# Patient Record
Sex: FEMALE | Race: WHITE | NOT HISPANIC OR LATINO | Employment: FULL TIME | URBAN - METROPOLITAN AREA
[De-identification: names, ages, dates, MRNs, and addresses within clinical notes are randomized per-mention and may not be internally consistent; named-entity substitution may affect disease eponyms.]

---

## 2017-11-16 ENCOUNTER — APPOINTMENT (OUTPATIENT)
Dept: WOMENS IMAGING | Facility: HOSPITAL | Age: 52
End: 2017-11-16

## 2017-11-16 PROCEDURE — 77067 SCR MAMMO BI INCL CAD: CPT | Performed by: RADIOLOGY

## 2019-10-11 ENCOUNTER — APPOINTMENT (OUTPATIENT)
Dept: WOMENS IMAGING | Facility: HOSPITAL | Age: 54
End: 2019-10-11

## 2019-10-11 PROCEDURE — 77067 SCR MAMMO BI INCL CAD: CPT | Performed by: RADIOLOGY

## 2019-11-11 ENCOUNTER — OFFICE VISIT CONVERTED (OUTPATIENT)
Dept: FAMILY MEDICINE CLINIC | Age: 54
End: 2019-11-11
Attending: NURSE PRACTITIONER

## 2019-11-11 ENCOUNTER — HOSPITAL ENCOUNTER (OUTPATIENT)
Dept: OTHER | Facility: HOSPITAL | Age: 54
Discharge: HOME | End: 2019-11-11
Attending: NURSE PRACTITIONER

## 2021-05-18 NOTE — PROGRESS NOTES
Kendal Bailey  1965     Office/Outpatient Visit    Visit Date: Mon, Nov 11, 2019 02:42 pm    Provider: Lita Baird N.P. (Assistant: Kera Tapia MA)    Location: [facility]        Electronically signed by Lita Baird N.P. on  11/11/2019 05:16:19 PM                             Subjective:        CC: Ms. Bailey is a 54 year old female.  This is her first visit to the clinic.  missed step and fell, hurt right ankle, left foot;         HPI:           Patient to be evaluated for pain in unspecified ankle and joints of unspecified foot.  The pain is primarily in the right ankle.  The level of pain between 1 and 10 is a 7.  Swelling is moderate, lateral.  She characterizes it as constant, moderate in intensity, severe, aching, and throbbing.  It began several hours ago.  The precipitating event appears to have been a fall; the actual mechanism of injury was stepped off a porch, missed the step and rolled her ankle - uncertain of the mechanism of the injury.  Associated symptoms include ankle instability, bruising, swelling and weakness.  She denies associated numbness.  Pertinent past medical history is unremarkable.  Other details: she did elevated, ice and wrap with an ace wrap immediately.  She did also land on her left 4tha nd 5th toes during her fall, which are in quite a bit of pain.      ROS:     CONSTITUTIONAL:  Negative for chills, fatigue, fever, and weight change.      EYES:  Negative for blurred vision.      CARDIOVASCULAR:  Positive for pedal edema ( right ankle ).   Negative for chest pain, orthopnea or paroxysmal nocturnal dyspnea.      RESPIRATORY:  Negative for dyspnea.      GASTROINTESTINAL:  Negative for abdominal pain, constipation, diarrhea, nausea and vomiting.      MUSCULOSKELETAL:  Positive for arthralgias, joint stiffness and (right ankle and left toes) limb pain.      NEUROLOGICAL:  Positive for weakness ( right foot/ankle ).          Past Medical History / Family  History / Social History:         Last Reviewed on 2019 03:03 PM by Lita Baird    Past Medical History:     UNREMARKABLE         PREVENTIVE HEALTH MAINTENANCE             COLORECTAL CANCER SCREENING: cologuard schedlued     DENTAL CLEANING: was last done  - [facility]     EYE EXAM: was last done  - Dr. Palma     MAMMOGRAM: was last done 2019 with normal results     PAP SMEAR: was last done 2019 with normal results [facility]         Surgical History:     NONE         Family History:         Positive for Coronary Artery Disease ( father ) and Hypertension ( father; mother ).  Father:  at age 80; Cause of death was cancer         Social History:     Occupation: OTHER (enter);     Marital Status:      Children: None         Tobacco/Alcohol/Supplements:     Last Reviewed on 2019 03:03 PM by Lita Baird    Tobacco:  Nonsmoker (never smoked);         Alcohol:    Drinks alcohol very infrequently.          Substance Abuse History:     Last Reviewed on 2019 03:03 PM by Lita Baird    None         Mental Health History:     Last Reviewed on 2019 03:03 PM by Lita Baird    NEGATIVE         Communicable Diseases (eg STDs):     Last Reviewed on 2019 03:03 PM by Lita Baird        Current Problems:     Last Reviewed on 2019 03:03 PM by Lita Baird    Pain in left foot    Sprain of other ligament of right ankle, initial encounter        Immunizations:     None        Allergies:     Last Reviewed on 2019 03:03 PM by Lita Baird    No Known Allergies.        Current Medications:     Last Reviewed on 2019 03:03 PM by Lita Baird    Ibuprofen 200 mg oral capsule [PRN]        Objective:        Vitals:         Current: 2019 2:50:01 PM    Ht:  5 ft, 7.5 in;  Wt: 185 lbs (Estimated);  BMI: 28.5T: 98.1 F (oral);  BP: 143/65 mm Hg (left arm, sitting);  P: 77 bpm (left arm (BP Cuff), sitting)        Exams:      PHYSICAL EXAM:     GENERAL: vital signs recorded - well developed, well nourished;  no apparent distress;     RESPIRATORY: normal respiratory rate and pattern with no distress; normal breath sounds with no rales, rhonchi, wheezes or rubs;     CARDIOVASCULAR: normal rate; rhythm is regular;  no systolic murmur; no edema;     GASTROINTESTINAL: nontender; normal bowel sounds; no organomegaly;     MUSCULOSKELETAL: gait: wheelchair-bound;  decreased range of motion noted in: right ankle;  pain with range of motion in: right ankle flexion and external rotation;     NEUROLOGICAL:  cranial nerves, motor and sensory function, reflexes, gait and coordination are all intact;     PSYCHIATRIC:  appropriate affect and demeanor; normal speech pattern; grossly normal memory;         Assessment:         S93.491A   Sprain of other ligament of right ankle, initial encounter       M79.672   Pain in left foot       Z13.31   Encounter for screening for depression           ORDERS:         Meds Prescribed:       [New Rx] ibuprofen 800 mg oral tablet [take 1 tablet (800 mg) by oral route 3 times per day with food PRN pain], #90 (ninety) tablets, Refills: 1 (one)         Radiology/Test Orders:       [id number]  Radiologic examination, right ankle complete minimum 3 views  (Send-Out)            [id number]  Left radiologic examination, foot; complete, minimum of three views  (Send-Out)              Other Orders:         Depression screen negative  (In-House)                      Plan:         Sprain of other ligament of right ankle, initial encounterelevated, rest, ice  ibuprofen  She does have an appt with orthopedist Manjit in [place] tomorrow - She is a  and is on her feet - she will need to be off work for some time to allow complete healing  - based on xray results        RADIOLOGY:  I have ordered a right ankle xray to be done today.            Prescriptions:       [New Rx] ibuprofen 800 mg oral tablet  [take 1 tablet (800 mg) by oral route 3 times per day with food PRN pain], #90 (ninety) tablets, Refills: 1 (one)           Orders:       [id number]  Radiologic examination, right ankle complete minimum 3 views  (Send-Out)              Pain in left foot        RADIOLOGY:  I have ordered a left foot x-ray to be done today.            Orders:       [id number]  Left radiologic examination, foot; complete, minimum of three views  (Send-Out)              Encounter for screening for depression    MIPS PHQ-9 Depression Screening: Completed form scanned and in chart; Total Score 0; Negative Depression Screen           Orders:         Depression screen negative  (In-House)                  Patient Recommendations:        For  Sprain of other ligament of right ankle, initial encounter:    right ankle x-ray             Charge Capture:         Primary Diagnosis:     S93.491A  Sprain of other ligament of right ankle, initial encounter           Orders:      [id number]  Office visit - new pt, level 2  (In-House)              M79.672  Pain in left foot     Z13.31  Encounter for screening for depression           Orders:        Depression screen negative  (In-House)

## 2021-07-01 VITALS
HEIGHT: 68 IN | WEIGHT: 185 LBS | DIASTOLIC BLOOD PRESSURE: 65 MMHG | TEMPERATURE: 98.1 F | BODY MASS INDEX: 28.04 KG/M2 | HEART RATE: 77 BPM | SYSTOLIC BLOOD PRESSURE: 143 MMHG

## 2021-08-23 ENCOUNTER — LAB (OUTPATIENT)
Dept: LAB | Facility: HOSPITAL | Age: 56
End: 2021-08-23

## 2021-08-23 ENCOUNTER — OFFICE VISIT (OUTPATIENT)
Dept: FAMILY MEDICINE CLINIC | Age: 56
End: 2021-08-23

## 2021-08-23 VITALS
HEART RATE: 79 BPM | DIASTOLIC BLOOD PRESSURE: 75 MMHG | SYSTOLIC BLOOD PRESSURE: 162 MMHG | BODY MASS INDEX: 31.46 KG/M2 | WEIGHT: 207.6 LBS | HEIGHT: 68 IN

## 2021-08-23 DIAGNOSIS — S99.922A INJURY OF TOE, LEFT, INITIAL ENCOUNTER: Primary | ICD-10-CM

## 2021-08-23 DIAGNOSIS — Z13.6 SCREENING FOR CARDIOVASCULAR CONDITION: ICD-10-CM

## 2021-08-23 DIAGNOSIS — R03.0 ELEVATED BLOOD-PRESSURE READING WITHOUT DIAGNOSIS OF HYPERTENSION: ICD-10-CM

## 2021-08-23 LAB
ALBUMIN SERPL-MCNC: 4.2 G/DL (ref 3.5–5.2)
ALBUMIN/GLOB SERPL: 1.5 G/DL
ALP SERPL-CCNC: 72 U/L (ref 39–117)
ALT SERPL W P-5'-P-CCNC: 15 U/L (ref 1–33)
ANION GAP SERPL CALCULATED.3IONS-SCNC: 7.8 MMOL/L (ref 5–15)
AST SERPL-CCNC: 17 U/L (ref 1–32)
BILIRUB SERPL-MCNC: 0.4 MG/DL (ref 0–1.2)
BUN SERPL-MCNC: 15 MG/DL (ref 6–20)
BUN/CREAT SERPL: 16.7 (ref 7–25)
CALCIUM SPEC-SCNC: 9.5 MG/DL (ref 8.6–10.5)
CHLORIDE SERPL-SCNC: 103 MMOL/L (ref 98–107)
CHOLEST SERPL-MCNC: 223 MG/DL (ref 0–200)
CO2 SERPL-SCNC: 28.2 MMOL/L (ref 22–29)
CREAT SERPL-MCNC: 0.9 MG/DL (ref 0.57–1)
GFR SERPL CREATININE-BSD FRML MDRD: 64 ML/MIN/1.73
GLOBULIN UR ELPH-MCNC: 2.8 GM/DL
GLUCOSE SERPL-MCNC: 87 MG/DL (ref 65–99)
HDLC SERPL-MCNC: 55 MG/DL (ref 40–60)
LDLC SERPL CALC-MCNC: 147 MG/DL (ref 0–100)
LDLC/HDLC SERPL: 2.63 {RATIO}
POTASSIUM SERPL-SCNC: 4.2 MMOL/L (ref 3.5–5.2)
PROT SERPL-MCNC: 7 G/DL (ref 6–8.5)
SODIUM SERPL-SCNC: 139 MMOL/L (ref 136–145)
TRIGL SERPL-MCNC: 116 MG/DL (ref 0–150)
VLDLC SERPL-MCNC: 21 MG/DL (ref 5–40)

## 2021-08-23 PROCEDURE — 80053 COMPREHEN METABOLIC PANEL: CPT | Performed by: FAMILY MEDICINE

## 2021-08-23 PROCEDURE — 99213 OFFICE O/P EST LOW 20 MIN: CPT | Performed by: FAMILY MEDICINE

## 2021-08-23 PROCEDURE — 36415 COLL VENOUS BLD VENIPUNCTURE: CPT | Performed by: FAMILY MEDICINE

## 2021-08-23 PROCEDURE — 80061 LIPID PANEL: CPT | Performed by: FAMILY MEDICINE

## 2021-08-23 NOTE — PROGRESS NOTES
"Chief Complaint  Toe Injury    Subjective          Kendal Bailey presents to [facility]  History of Present Illness  Yesterday injured left foot pinkie toe on a boat.  Has tyson tape the fourth and fifth toes together.  Unable to wear shoes today.  Is on flip-flops at the office.  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  She does not think she can stand on her feet for the amount of time necessary at work.  Review of Systems     Objective   Vital Signs:   /75   Pulse 79   Ht 171.5 cm (67.52\")   Wt 94.2 kg (207 lb 9.6 oz)   BMI 32.02 kg/m²     Physical Exam   Fourth and fifth digits on the left foot were tyson taped together.  The fifth toe itself was slightly swollen but no discoloration.  Outpatient of the metatarsals fourth and fifth digits were without tenderness.  Lateral movement of the fifth toe causes discomfort.  Result Review :                 Assessment and Plan    Diagnoses and all orders for this visit:    1. Injury of toe, left, initial encounter (Primary)  Assessment & Plan:  I doubt that there is a toe fracture.  She probably just has a sprain.  We will hold off on x-rays for now she can tyson tape and stay off the foot .  Wrote her a note to be out of work until Monday.  If she improves sooner and wants to return to work we can provide a note releases her back to full duty.  If she worsens or fails to improve can get an x-ray.  Use ice, elevation, and Tylenol the time being.      2. Elevated blood-pressure reading without diagnosis of hypertension  -     Lipid Panel  -     Comprehensive Metabolic Panel    3. Screening for cardiovascular condition  -     Lipid Panel      Follow Up   No follow-ups on file.  Patient was given instructions and counseling regarding her condition or for health maintenance advice. Please see specific information pulled into the AVS if appropriate.       "

## 2021-08-23 NOTE — ASSESSMENT & PLAN NOTE
Did note that her blood pressure was elevated today.  Of course she is in pain.  But her blood pressure is also slightly elevated last time she was here in 2019.  We will go ahead and get some baseline labs and ask her to follow-up for general health evaluation with her PCP

## 2021-08-23 NOTE — ASSESSMENT & PLAN NOTE
I doubt that there is a toe fracture.  She probably just has a sprain.  We will hold off on x-rays for now she can tyson tape and stay off the foot .  Wrote her a note to be out of work until Monday.  If she improves sooner and wants to return to work we can provide a note releases her back to full duty.  If she worsens or fails to improve can get an x-ray.  Use ice, elevation, and Tylenol the time being.

## 2021-08-30 ENCOUNTER — TELEPHONE (OUTPATIENT)
Dept: FAMILY MEDICINE CLINIC | Age: 56
End: 2021-08-30

## 2021-08-30 NOTE — TELEPHONE ENCOUNTER
Caller: Kendal Bailey    Relationship: Self    Best call back number: [phone]    What is the best time to reach you:ANYTIME    Who are you requesting to speak with (clinical staff, provider,  specific staff member): NURSE     What was the call regarding: PATIENT WOULD LIKE TO HAVE SOMEONE CALL HER AND GIVER HER LAB RESULTS AND ALSO WOULD LIKE TO HAVE A COPY MAILED TO HER.     Do you require a callback: YES

## 2022-09-07 ENCOUNTER — APPOINTMENT (OUTPATIENT)
Dept: WOMENS IMAGING | Facility: HOSPITAL | Age: 57
End: 2022-09-07

## 2022-09-07 PROCEDURE — 77067 SCR MAMMO BI INCL CAD: CPT | Performed by: RADIOLOGY

## 2022-09-07 PROCEDURE — 77063 BREAST TOMOSYNTHESIS BI: CPT | Performed by: RADIOLOGY

## 2025-02-22 ENCOUNTER — TELEPHONE (OUTPATIENT)
Dept: FAMILY MEDICINE CLINIC | Age: 60
End: 2025-02-22
Payer: COMMERCIAL

## 2025-02-23 NOTE — TELEPHONE ENCOUNTER
Please reach out to Kendal and see if she would like to schedule a 30-minute new patient appointment with me.  I am not technically taking on new patients, but I would be willing to make an exception.  We may need to use 2, 15-minute slots to accommodate.  Let me know if she has some immediate concern.  Thanks.

## 2025-03-14 ENCOUNTER — OFFICE VISIT (OUTPATIENT)
Dept: FAMILY MEDICINE CLINIC | Age: 60
End: 2025-03-14
Payer: COMMERCIAL

## 2025-03-14 ENCOUNTER — LAB (OUTPATIENT)
Dept: LAB | Facility: HOSPITAL | Age: 60
End: 2025-03-14
Payer: COMMERCIAL

## 2025-03-14 VITALS
HEIGHT: 68 IN | OXYGEN SATURATION: 99 % | BODY MASS INDEX: 30.95 KG/M2 | TEMPERATURE: 98.1 F | DIASTOLIC BLOOD PRESSURE: 83 MMHG | HEART RATE: 67 BPM | WEIGHT: 204.2 LBS | SYSTOLIC BLOOD PRESSURE: 164 MMHG

## 2025-03-14 DIAGNOSIS — E78.2 MIXED HYPERLIPIDEMIA: ICD-10-CM

## 2025-03-14 DIAGNOSIS — Z11.59 NEED FOR HEPATITIS C SCREENING TEST: ICD-10-CM

## 2025-03-14 DIAGNOSIS — R03.0 ELEVATED BLOOD-PRESSURE READING WITHOUT DIAGNOSIS OF HYPERTENSION: ICD-10-CM

## 2025-03-14 DIAGNOSIS — Z00.00 PHYSICAL EXAM: Primary | ICD-10-CM

## 2025-03-14 DIAGNOSIS — E66.09 CLASS 1 OBESITY DUE TO EXCESS CALORIES WITH SERIOUS COMORBIDITY AND BODY MASS INDEX (BMI) OF 30.0 TO 30.9 IN ADULT: ICD-10-CM

## 2025-03-14 DIAGNOSIS — E66.811 CLASS 1 OBESITY DUE TO EXCESS CALORIES WITH SERIOUS COMORBIDITY AND BODY MASS INDEX (BMI) OF 30.0 TO 30.9 IN ADULT: ICD-10-CM

## 2025-03-14 DIAGNOSIS — Z00.00 PHYSICAL EXAM: ICD-10-CM

## 2025-03-14 DIAGNOSIS — Z12.11 SCREEN FOR COLON CANCER: ICD-10-CM

## 2025-03-14 LAB
ALBUMIN SERPL-MCNC: 4.5 G/DL (ref 3.5–5.2)
ALBUMIN/GLOB SERPL: 1.7 G/DL
ALP SERPL-CCNC: 81 U/L (ref 39–117)
ALT SERPL W P-5'-P-CCNC: 12 U/L (ref 1–33)
ANION GAP SERPL CALCULATED.3IONS-SCNC: 7.6 MMOL/L (ref 5–15)
AST SERPL-CCNC: 22 U/L (ref 1–32)
BACTERIA UR QL AUTO: ABNORMAL /HPF
BILIRUB SERPL-MCNC: 0.4 MG/DL (ref 0–1.2)
BILIRUB UR QL STRIP: NEGATIVE
BUN SERPL-MCNC: 10 MG/DL (ref 8–23)
BUN/CREAT SERPL: 10.8 (ref 7–25)
CALCIUM SPEC-SCNC: 9.8 MG/DL (ref 8.6–10.5)
CHLORIDE SERPL-SCNC: 104 MMOL/L (ref 98–107)
CHOLEST SERPL-MCNC: 227 MG/DL (ref 0–200)
CLARITY UR: CLEAR
CO2 SERPL-SCNC: 26.4 MMOL/L (ref 22–29)
COLOR UR: YELLOW
CREAT SERPL-MCNC: 0.93 MG/DL (ref 0.57–1)
DEPRECATED RDW RBC AUTO: 47.7 FL (ref 37–54)
EGFRCR SERPLBLD CKD-EPI 2021: 69.6 ML/MIN/1.73
ERYTHROCYTE [DISTWIDTH] IN BLOOD BY AUTOMATED COUNT: 13.4 % (ref 12.3–15.4)
GLOBULIN UR ELPH-MCNC: 2.7 GM/DL
GLUCOSE SERPL-MCNC: 94 MG/DL (ref 65–99)
GLUCOSE UR STRIP-MCNC: NEGATIVE MG/DL
HBA1C MFR BLD: 5.3 % (ref 4.8–5.6)
HCT VFR BLD AUTO: 45.8 % (ref 34–46.6)
HCV AB SER QL: NORMAL
HDLC SERPL-MCNC: 56 MG/DL (ref 40–60)
HGB BLD-MCNC: 15.1 G/DL (ref 12–15.9)
HGB UR QL STRIP.AUTO: NEGATIVE
KETONES UR QL STRIP: NEGATIVE
LDLC SERPL CALC-MCNC: 144 MG/DL (ref 0–100)
LDLC/HDLC SERPL: 2.51 {RATIO}
LEUKOCYTE ESTERASE UR QL STRIP.AUTO: ABNORMAL
MCH RBC QN AUTO: 31 PG (ref 26.6–33)
MCHC RBC AUTO-ENTMCNC: 33 G/DL (ref 31.5–35.7)
MCV RBC AUTO: 94 FL (ref 79–97)
NITRITE UR QL STRIP: NEGATIVE
PH UR STRIP.AUTO: 6 [PH] (ref 5–8)
PLATELET # BLD AUTO: 276 10*3/MM3 (ref 140–450)
PMV BLD AUTO: 10.5 FL (ref 6–12)
POTASSIUM SERPL-SCNC: 4 MMOL/L (ref 3.5–5.2)
PROT SERPL-MCNC: 7.2 G/DL (ref 6–8.5)
PROT UR QL STRIP: NEGATIVE
RBC # BLD AUTO: 4.87 10*6/MM3 (ref 3.77–5.28)
RBC # UR STRIP: ABNORMAL /HPF
REF LAB TEST METHOD: ABNORMAL
SODIUM SERPL-SCNC: 138 MMOL/L (ref 136–145)
SP GR UR STRIP: 1.01 (ref 1–1.03)
SQUAMOUS #/AREA URNS HPF: ABNORMAL /HPF
TRIGL SERPL-MCNC: 151 MG/DL (ref 0–150)
TSH SERPL DL<=0.05 MIU/L-ACNC: 1.74 UIU/ML (ref 0.27–4.2)
UROBILINOGEN UR QL STRIP: ABNORMAL
VLDLC SERPL-MCNC: 27 MG/DL (ref 5–40)
WBC # UR STRIP: ABNORMAL /HPF
WBC NRBC COR # BLD AUTO: 8.08 10*3/MM3 (ref 3.4–10.8)

## 2025-03-14 PROCEDURE — 86803 HEPATITIS C AB TEST: CPT

## 2025-03-14 PROCEDURE — 81001 URINALYSIS AUTO W/SCOPE: CPT

## 2025-03-14 PROCEDURE — 85027 COMPLETE CBC AUTOMATED: CPT

## 2025-03-14 PROCEDURE — 36415 COLL VENOUS BLD VENIPUNCTURE: CPT

## 2025-03-14 PROCEDURE — 80061 LIPID PANEL: CPT

## 2025-03-14 PROCEDURE — 84443 ASSAY THYROID STIM HORMONE: CPT

## 2025-03-14 PROCEDURE — 80053 COMPREHEN METABOLIC PANEL: CPT

## 2025-03-14 PROCEDURE — 83036 HEMOGLOBIN GLYCOSYLATED A1C: CPT
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
## 2025-03-18 ENCOUNTER — RESULTS FOLLOW-UP (OUTPATIENT)
Dept: FAMILY MEDICINE CLINIC | Age: 60
End: 2025-03-18
Payer: COMMERCIAL

## 2025-03-24 ENCOUNTER — HOSPITAL ENCOUNTER (OUTPATIENT)
Dept: CT IMAGING | Facility: HOSPITAL | Age: 60
Discharge: HOME | End: 2025-03-24
Admitting: FAMILY MEDICINE

## 2025-03-24 DIAGNOSIS — Z13.6 ENCOUNTER FOR SCREENING FOR CORONARY ARTERY DISEASE: ICD-10-CM

## 2025-03-24 DIAGNOSIS — E78.2 MIXED HYPERLIPIDEMIA: ICD-10-CM

## 2025-03-24 PROCEDURE — 75571 CT HRT W/O DYE W/CA TEST: CPT

## 2025-04-07 ENCOUNTER — TELEPHONE (OUTPATIENT)
Dept: FAMILY MEDICINE CLINIC | Age: 60
End: 2025-04-07
Payer: COMMERCIAL

## 2025-04-07 NOTE — TELEPHONE ENCOUNTER
----- Message from Zoila CARBALLO sent at 3/17/2025 11:01 AM EDT -----  TICKLE for blood pressure check in 3 weeks.

## 2025-04-10 ENCOUNTER — CLINICAL SUPPORT (OUTPATIENT)
Dept: FAMILY MEDICINE CLINIC | Age: 60
End: 2025-04-10
Payer: COMMERCIAL

## 2025-04-10 VITALS — SYSTOLIC BLOOD PRESSURE: 147 MMHG | HEART RATE: 77 BPM | DIASTOLIC BLOOD PRESSURE: 83 MMHG

## 2025-04-11 ENCOUNTER — PATIENT MESSAGE (OUTPATIENT)
Dept: FAMILY MEDICINE CLINIC | Age: 60
End: 2025-04-11
Payer: COMMERCIAL

## 2025-04-11 NOTE — PROGRESS NOTES
Vitals:    04/10/25 1451   BP: 147/83   BP Location: Left arm   Patient Position: Sitting   Pulse: 77     Good morning, Kendal.  Thank you for stopping by yesterday.  Your blood pressure was mildly elevated.  Have you checked your blood pressure at home any since we saw you last?  It remains an open question whether to consider treatment for hypertension.  I would like to get at least 1 more reading before considering that further.  We will likely call you in about a month for a blood pressure check again.  Let me know if you have other concerns.    Lyndon Salazar MD  [facility]    PS - [employer] messages are not reviewed on an urgent basis.  It may be several days before we review and/or respond to your message.  If you have an urgent need, please call the office at [phone].

## 2025-05-12 ENCOUNTER — TELEPHONE (OUTPATIENT)
Dept: FAMILY MEDICINE CLINIC | Age: 60
End: 2025-05-12
Payer: COMMERCIAL

## 2025-05-12 NOTE — TELEPHONE ENCOUNTER
Pts insurance states lab pt had done in March were not coded with preventative wellness dx in leading position. They are requesting this be corrected and claim resubmitted.

## 2025-05-13 NOTE — TELEPHONE ENCOUNTER
It appears the dx Z00.00 which is preventative is listed, but I guess it wasn't in the leading position when claim submitted?

## 2025-05-15 ENCOUNTER — CLINICAL SUPPORT (OUTPATIENT)
Dept: FAMILY MEDICINE CLINIC | Age: 60
End: 2025-05-15
Payer: COMMERCIAL

## 2025-05-15 VITALS — DIASTOLIC BLOOD PRESSURE: 79 MMHG | HEART RATE: 70 BPM | SYSTOLIC BLOOD PRESSURE: 136 MMHG

## 2025-05-17 ENCOUNTER — PATIENT MESSAGE (OUTPATIENT)
Dept: FAMILY MEDICINE CLINIC | Age: 60
End: 2025-05-17
Payer: COMMERCIAL

## 2025-05-18 NOTE — PROGRESS NOTES
Vitals:    05/15/25 1631 05/15/25 1636   BP: 154/76  Comment: after sitting 5 minutes 136/79  Comment: after sitting 5 more minutes   BP Location: Left arm Left arm   Patient Position: Sitting Sitting   Pulse: 68 70     Good evening, Kendal.  I hope you are well.  Your initial blood pressure yesterday was above goal, but the follow-up reading was better.  I would lean against a blood pressure medication at this time, but please forward your home blood pressure log at your convenience for me to review.  I would again encourage you to continue good health habits generally including healthy diet, limited salt intake, regular exercise as tolerated, and working toward some weight loss.  Let me know if you have other concerns.    Lyndon Salazar MD  [facility]    PS - MyChart messages are not reviewed on an urgent basis.  It may be several days before we review and/or respond to your message.  If you have an urgent need, please call the office at [phone].